# Patient Record
Sex: MALE | HISPANIC OR LATINO | Employment: OTHER | URBAN - METROPOLITAN AREA
[De-identification: names, ages, dates, MRNs, and addresses within clinical notes are randomized per-mention and may not be internally consistent; named-entity substitution may affect disease eponyms.]

---

## 2024-06-14 ENCOUNTER — HOSPITAL ENCOUNTER (EMERGENCY)
Facility: MEDICAL CENTER | Age: 44
End: 2024-06-14
Attending: EMERGENCY MEDICINE

## 2024-06-14 ENCOUNTER — APPOINTMENT (OUTPATIENT)
Dept: RADIOLOGY | Facility: MEDICAL CENTER | Age: 44
End: 2024-06-14
Attending: EMERGENCY MEDICINE

## 2024-06-14 VITALS
HEART RATE: 86 BPM | SYSTOLIC BLOOD PRESSURE: 97 MMHG | DIASTOLIC BLOOD PRESSURE: 55 MMHG | TEMPERATURE: 97.7 F | WEIGHT: 185.19 LBS | OXYGEN SATURATION: 97 % | BODY MASS INDEX: 38.7 KG/M2 | RESPIRATION RATE: 16 BRPM

## 2024-06-14 DIAGNOSIS — R10.84 GENERALIZED ABDOMINAL PAIN: ICD-10-CM

## 2024-06-14 DIAGNOSIS — K59.00 CONSTIPATION, UNSPECIFIED CONSTIPATION TYPE: ICD-10-CM

## 2024-06-14 LAB
ALBUMIN SERPL BCP-MCNC: 4.3 G/DL (ref 3.2–4.9)
ALBUMIN/GLOB SERPL: 1.4 G/DL
ALP SERPL-CCNC: 96 U/L (ref 30–99)
ALT SERPL-CCNC: 40 U/L (ref 2–50)
ANION GAP SERPL CALC-SCNC: 15 MMOL/L (ref 7–16)
AST SERPL-CCNC: 26 U/L (ref 12–45)
BASOPHILS # BLD AUTO: 1.9 % (ref 0–1.8)
BASOPHILS # BLD: 0.1 K/UL (ref 0–0.12)
BILIRUB SERPL-MCNC: 0.5 MG/DL (ref 0.1–1.5)
BUN SERPL-MCNC: 10 MG/DL (ref 8–22)
CALCIUM ALBUM COR SERPL-MCNC: 9.3 MG/DL (ref 8.5–10.5)
CALCIUM SERPL-MCNC: 9.5 MG/DL (ref 8.5–10.5)
CHLORIDE SERPL-SCNC: 104 MMOL/L (ref 96–112)
CO2 SERPL-SCNC: 21 MMOL/L (ref 20–33)
CREAT SERPL-MCNC: 0.76 MG/DL (ref 0.5–1.4)
EOSINOPHIL # BLD AUTO: 0.11 K/UL (ref 0–0.51)
EOSINOPHIL NFR BLD: 2.1 % (ref 0–6.9)
ERYTHROCYTE [DISTWIDTH] IN BLOOD BY AUTOMATED COUNT: 49.5 FL (ref 35.9–50)
GFR SERPLBLD CREATININE-BSD FMLA CKD-EPI: 112 ML/MIN/1.73 M 2
GLOBULIN SER CALC-MCNC: 3 G/DL (ref 1.9–3.5)
GLUCOSE SERPL-MCNC: 155 MG/DL (ref 65–99)
HCT VFR BLD AUTO: 44.5 % (ref 42–52)
HGB BLD-MCNC: 15.6 G/DL (ref 14–18)
IMM GRANULOCYTES # BLD AUTO: 0.01 K/UL (ref 0–0.11)
IMM GRANULOCYTES NFR BLD AUTO: 0.2 % (ref 0–0.9)
LIPASE SERPL-CCNC: 38 U/L (ref 11–82)
LYMPHOCYTES # BLD AUTO: 1.95 K/UL (ref 1–4.8)
LYMPHOCYTES NFR BLD: 37.3 % (ref 22–41)
MCH RBC QN AUTO: 30.4 PG (ref 27–33)
MCHC RBC AUTO-ENTMCNC: 35.1 G/DL (ref 32.3–36.5)
MCV RBC AUTO: 86.7 FL (ref 81.4–97.8)
MONOCYTES # BLD AUTO: 0.38 K/UL (ref 0–0.85)
MONOCYTES NFR BLD AUTO: 7.3 % (ref 0–13.4)
NEUTROPHILS # BLD AUTO: 2.68 K/UL (ref 1.82–7.42)
NEUTROPHILS NFR BLD: 51.2 % (ref 44–72)
NRBC # BLD AUTO: 0 K/UL
NRBC BLD-RTO: 0 /100 WBC (ref 0–0.2)
PLATELET # BLD AUTO: 243 K/UL (ref 164–446)
PMV BLD AUTO: 10.3 FL (ref 9–12.9)
POTASSIUM SERPL-SCNC: 3.8 MMOL/L (ref 3.6–5.5)
PROT SERPL-MCNC: 7.3 G/DL (ref 6–8.2)
RBC # BLD AUTO: 5.13 M/UL (ref 4.7–6.1)
SODIUM SERPL-SCNC: 140 MMOL/L (ref 135–145)
WBC # BLD AUTO: 5.2 K/UL (ref 4.8–10.8)

## 2024-06-14 PROCEDURE — 36415 COLL VENOUS BLD VENIPUNCTURE: CPT

## 2024-06-14 PROCEDURE — 83690 ASSAY OF LIPASE: CPT

## 2024-06-14 PROCEDURE — 80053 COMPREHEN METABOLIC PANEL: CPT

## 2024-06-14 PROCEDURE — 99284 EMERGENCY DEPT VISIT MOD MDM: CPT

## 2024-06-14 PROCEDURE — 85025 COMPLETE CBC W/AUTO DIFF WBC: CPT

## 2024-06-14 PROCEDURE — 74018 RADEX ABDOMEN 1 VIEW: CPT

## 2024-06-14 RX ORDER — DOCUSATE SODIUM 100 MG/1
100 CAPSULE, LIQUID FILLED ORAL 2 TIMES DAILY
Qty: 60 CAPSULE | Refills: 0 | Status: SHIPPED | OUTPATIENT
Start: 2024-06-14 | End: 2024-08-08

## 2024-06-14 NOTE — ED NOTES
Family at bedside. Patient given urinal, encouraged to provide sample when able. Patient given warm blankets for comfort.

## 2024-06-14 NOTE — DISCHARGE INSTRUCTIONS
Tus análisis de kika aquí fueron muy tranquilizadores. No hay signos de infección u otro problema. Bangura radiografía de abdomen no mostró signos de obstrucción. Hay bastante estreñimiento, lo que puede provocar dolor abdominal. Recomendamos comenzar con un ablandador de heces diario, que puede aliviar bangura dolor. Si bangura dolor no mejora, hable con bangura proveedor de atención primaria sobre vladimir derivación a cirugía general para considerar repetir la reparación de la hernia. A veces las hernias pueden resultar incómodas, sophia no son peligrosas a menos que queden atrapadas fuera de la pared abdominal.    Your blood tests here were very reassuring.  There is no sign of infection or other problem.  Your abdominal x-ray did not show signs of obstruction.  There is quite a bit of constipation, which can cause abdominal pain.  We recommend starting a daily stool softener, which may relieve your pain.  If your pain is not improving, talk to your primary care provider about a referral to general surgery to consider repeat hernia repair.  Hernias can sometimes be uncomfortable, but are not dangerous unless they are trapped outside the abdominal wall.

## 2024-06-14 NOTE — ED PROVIDER NOTES
ER Provider Note    Scribed for Hugo Trujillo M.d. by Rodriguez Gerardo. 6/14/2024  4:14 PM    Primary Care Provider: BRANDY Gonsalez    CHIEF COMPLAINT  Chief Complaint   Patient presents with    Abdominal Pain     Per family pt has abd hernia and now is complaining of pain.  Denies constipation     EXTERNAL RECORDS REVIEWED  External ED Note Records show patient had an incarcerated hernia in 2009.    HPI/ROS  LIMITATION TO HISTORY   Select: Language Niuean.     OUTSIDE HISTORIAN(S):  Family were present at bedside and contributed to patient history.    Gerardo Garner is a 43 y.o. male who presents to the ED complaining of abdomen pain. Patient reportedly has  hernia located in his abdomen, family reports patient has been experiencing abdomen pain for the past few days.  The hernia is well-known to them, is incisional, at the site of a previous hernia repair.  No additional pain or symptoms noted at this time.  No fevers, no vomiting.  Vital signs on arrival.  Patient has a known history of Down syndrome, communicates appropriately, cooperative, denies abdominal pain at the moment.    PAST MEDICAL HISTORY  Past Medical History:   Diagnosis Date    Down syndrome     GERD (gastroesophageal reflux disease)     Obesity     HO (obstructive sleep apnea)     VENTRAL HERNIA        SURGICAL HISTORY  Past Surgical History:   Procedure Laterality Date    VENTRAL HERNIA REPAIR LAPAROSCOPIC  10/6/2009    [facility]    VENTRAL HERNIA REPAIR  2007       FAMILY HISTORY  Family History   Problem Relation Age of Onset    Diabetes Mother     Hyperlipidemia Mother        SOCIAL HISTORY   reports that he has never smoked. He does not have any smokeless tobacco history on file. He reports that he does not drink alcohol and does not use drugs.    CURRENT MEDICATIONS  Discharge Medication List as of 6/14/2024  5:48 PM        CONTINUE these medications which have NOT CHANGED     Details   hydrocodone-acetaminophen (NORCO) 5-325 MG TABS per tablet Take 1 Tab by mouth every 8 hours as needed., Disp-8 Tab, R-0, Print Rx Paper      ondansetron (ZOFRAN) 4 MG TABS tablet 4 mg, EVERY 4 HOURS PRN Starting 3/30/2015, Until Discontinued, Disp-20 Tab, R-1, Oral      silver sulfADIAZINE (SILVADENE) 1 % CREA Apply 0.5 g to affected area(s) every day. Apply to affected area(s) every day. X 2 weeks until axillary abrasions are healed., Disp-30 g, R-0, Print Plain Paper      ranitidine (ZANTAC) 300 MG tablet Take 1 Tab by mouth every day., Disp-60 Each, R-3, Print Plain Paper      metformin (GLUCOPHAGE) 500 MG TABS Take 1 Tab by mouth 2 times a day, with meals., Disp-60 Tab, R-3, Print Plain Paper      aspirin EC (ECOTRIN) 81 MG TBEC Take 1 Tab by mouth every day., Disp-30 Tab, R-3, Print Plain Paper      Cholecalciferol 2000 UNIT CAPS 1 cap po qday., Disp-30 Cap, R-3, Print Plain Paper      fluticasone (FLONASE) 50 MCG/ACT nasal spray Spray 2 Sprays in nose every day. Each Nostril, Disp-1 Bottle, R-3, Print Plain Paper      lisinopril (PRINIVIL) 5 MG TABS Take 1 Tab by mouth every day., Disp-30 Tab, R-3, Print Plain Paper      atorvastatin (LIPITOR) 40 MG TABS Take 1 Tab by mouth every day. RPH: please discuss statin  change with patient per [facility] formulary requirements. Thank you., Disp-30 Tab, R-3, Print Plain Paper      vitamin D, Ergocalciferol, (DRISDOL) 70716 UNITS CAPS capsule Take 1 Cap by mouth every 7 days., Disp-4 Cap, R-3, Print Rx Paper      omeprazole (PRILOSEC) 20 MG CPDR Take 3 Caps by mouth every day., Disp-30 Cap, R-3, No Print      !! Misc. Devices MISC by Does not apply route. Requesting evaluation/service for patient's CPAP., Disp-1 Each, R-0, Print Rx Paper      !! Misc. Devices MISC by Does not apply route. Heated humidifier for CPAP., Disp-1 Each, R-99, Print Rx Paper      VENTOLIN  (90 BASE) MCG/ACT AERS Historical Med       !! - Potential duplicate medications  found. Please discuss with provider.          ALLERGIES  Patient has no known allergies.    PHYSICAL EXAM  VITAL SIGNS: /70   Pulse 90   Temp 36.5 °C (97.7 °F) (Temporal)   Resp 16   Wt 84 kg (185 lb 3 oz)   SpO2 98%   BMI 38.70 kg/m²   Pulse ox interpretation: I interpret this pulse ox as normal.  Constitutional: Alert in no apparent distress.  HENT: No signs of trauma, Bilateral external ears normal, Nose normal.   Eyes: Conjunctiva normal, Non-icteric.   Neck: Normal range of motion, Supple, No stridor.   Lymphatic: No lymphadenopathy noted.   Cardiovascular: Regular rate and rhythm, no murmurs.   Thorax & Lungs: Normal breath sounds, No respiratory distress, No wheezing  Abdomen: Bowel sounds alexander easily reducible ventral hernia at site of previous hernia repair  Skin: Warm, Dry, No erythema, No rash.   Back: No midline bony tenderness.   Extremities: Intact distal pulses, No edema, No cyanosis.  Musculoskeletal: Good range of motion in all major joints. No or major deformities noted.   Neurologic: Alert , Normal motor function, Normal sensory function, No focal deficits noted.   Psychiatric: Affect normal, Judgment normal, Mood normal.     DIAGNOSTIC STUDIES    Labs:   Labs Reviewed   CBC WITH DIFFERENTIAL - Abnormal; Notable for the following components:       Result Value    Basophils 1.90 (*)     All other components within normal limits   COMP METABOLIC PANEL - Abnormal; Notable for the following components:    Glucose 155 (*)     All other components within normal limits   LIPASE   ESTIMATED GFR       Radiology:   The attending emergency physician has independently interpreted the diagnostic imaging associated with this visit and am waiting the final reading from the radiologist.     Preliminary interpretation is a follows: No obstruction.  Increase stool burden.    Radiologist interpretation:   [id number]-[id number] VIEW   Final Result      1.  Moderate constipation.          COURSE & MEDICAL DECISION  MAKING     INITIAL ASSESSMENT, COURSE AND PLAN  Care Narrative:         4:14 PM Patient presents to the ED with abdomen pain. Patient evaluated at bedside and discussed plan of care, including Imagining and lab work for further evaluation.  Labs were ordered at triage per protocol, GFR, CBC w.diff, CMP, Lipase to evaluate his symptoms.  These have resulted and are unremarkable, excluding infectious causes, pancreatitis, and other possibilities.  I have added a KUB to evaluate for any suggestion of obstruction.  Differential diagnoses include but not limited to: hernia, unlikely associated acute obstruction or incarceration, constipation may contribute to pain.     5:52 PM- Patient blood tests were very reassuring.  There is no sign of infection or other problem.  Abdominal x-ray did not show signs of obstruction. There is quite a bit of constipation, which can cause abdominal pain.  Recommend starting a daily stool softener, which may relieve pain.  If pain is not improving, recommended patient to talk to primary care provider about a referral to general surgery to consider repeat hernia repair. The patient will be discharged with Colace 100 mg and should return if symptoms worsen or if new symptoms arise. The patient understands and agrees to plan.  They will follow-up with primary care to consider whether or not referral back to general surgery for repeat hernia repair in the future as an option, though with central obesity, and incisional hernia, he is likely a poor surgical candidate.    ADDITIONAL PROBLEM MANAGED  History of of chronic hernia at site of previous hernia repair, in addition to acute presentation and concerns.    DISPOSITION AND DISCUSSIONS  I have discussed management of the patient with the following physicians and CHALINO's:  None      Escalation of care considered, and ultimately not performed: acute inpatient care management, however at this time, the patient is most appropriate for outpatient  management.    Barriers to care at this time, including but not limited to:  None .     Decision tools and prescription drugs considered including, but not limited to:  Colace 100 mg .    The patient will return for new or worsening symptoms and is stable at the time of discharge.    DISPOSITION:  Patient will be discharged home in stable condition.    FOLLOW UP:  BRANDY Gonsalez  [address]  [place] [address]  [phone]    Schedule an appointment as soon as possible for a visit       OUTPATIENT MEDICATIONS:  Discharge Medication List as of 6/14/2024  5:48 PM        START taking these medications    Details   docusate sodium (COLACE) 100 MG Cap Take 1 Capsule by mouth 2 times a day., Disp-60 Capsule, R-0, Normal              FINAL DIAGNOSIS  1. Constipation, unspecified constipation type    2. Generalized abdominal pain       Rodriguez FOREMAN (Thierry), am scribing for, and in the presence of, Hugo Trujillo M.D..    Electronically signed by: Rodriguez Yang), 6/14/2024    Hugo FOREMAN M.D. personally performed the services described in this documentation, as scribed by Rodriguez Gerardo in my presence, and it is both accurate and complete.

## 2024-06-14 NOTE — ED TRIAGE NOTES
Chief Complaint   Patient presents with    Abdominal Pain     Per family pt has abd hernia and now is complaining of pain.  Denies constipation     Pt c/o pain for weeks

## 2024-06-15 NOTE — ED NOTES
Patient discharged per order. Oral and written discharge instructions reviewed with patient, mother and sister.  All belongings accounted for and taken with patient. Questions answered, and patient agrees with discharge plan. Encouraged to follow up with PCP. Ambulatory to lobby with family

## 2024-08-02 ENCOUNTER — PRE-ADMISSION TESTING (OUTPATIENT)
Dept: ADMISSIONS | Facility: MEDICAL CENTER | Age: 44
End: 2024-08-02
Attending: SURGERY
Payer: MEDICAID

## 2024-08-08 ENCOUNTER — PRE-ADMISSION TESTING (OUTPATIENT)
Dept: ADMISSIONS | Facility: MEDICAL CENTER | Age: 44
End: 2024-08-08
Attending: SURGERY
Payer: MEDICAID

## 2024-08-08 ENCOUNTER — PRE-ADMISSION TESTING (OUTPATIENT)
Dept: ADMISSIONS | Facility: MEDICAL CENTER | Age: 44
End: 2024-08-08
Attending: SURGERY

## 2024-08-08 DIAGNOSIS — Z01.812 PRE-OPERATIVE LABORATORY EXAMINATION: ICD-10-CM

## 2024-08-08 DIAGNOSIS — Z01.810 PRE-OPERATIVE CARDIOVASCULAR EXAMINATION: ICD-10-CM

## 2024-08-08 LAB
EKG IMPRESSION: NORMAL
EST. AVERAGE GLUCOSE BLD GHB EST-MCNC: 183 MG/DL
HBA1C MFR BLD: 8 % (ref 4–5.6)

## 2024-08-08 PROCEDURE — 36415 COLL VENOUS BLD VENIPUNCTURE: CPT

## 2024-08-08 PROCEDURE — 83036 HEMOGLOBIN GLYCOSYLATED A1C: CPT

## 2024-08-08 PROCEDURE — 93005 ELECTROCARDIOGRAM TRACING: CPT

## 2024-08-08 PROCEDURE — 93010 ELECTROCARDIOGRAM REPORT: CPT | Performed by: INTERNAL MEDICINE

## 2024-08-08 RX ORDER — GLIMEPIRIDE 4 MG/1
2 TABLET ORAL DAILY
COMMUNITY
Start: 2024-06-07

## 2024-08-08 RX ORDER — MULTIVIT-MIN/IRON/FOLIC ACID/K 18-600-40
CAPSULE ORAL DAILY
COMMUNITY

## 2024-08-08 RX ORDER — GINSENG 100 MG
CAPSULE ORAL DAILY
COMMUNITY

## 2024-08-08 RX ORDER — SITAGLIPTIN 50 MG/1
50 TABLET, FILM COATED ORAL DAILY
COMMUNITY
Start: 2024-06-07

## 2024-08-08 RX ORDER — ACETAMINOPHEN 325 MG/1
650 TABLET ORAL EVERY 4 HOURS PRN
COMMUNITY

## 2024-08-08 RX ORDER — ATORVASTATIN CALCIUM 20 MG/1
1 TABLET, FILM COATED ORAL DAILY
COMMUNITY
Start: 2024-06-07

## 2024-08-08 RX ORDER — MULTIVIT WITH MINERALS/LUTEIN
TABLET ORAL DAILY
COMMUNITY

## 2024-08-08 NOTE — PREPROCEDURE INSTRUCTIONS
PreAdmit Telephone Appointment: Reviewed the Preparing for your procedure handout with patient's sister, Jia (with permission noted in epic) over the phone. instructed per pharmacy guidelines regarding taking, holding or contacting provider for instructions on regularly prescribed medications before surgery. Instructed to take the following medications the day of surgery with a sip of water per pharmacy medication guidelines: sister reports patient sleeps in so he does not take his medication until around noon, sister reports checking in at 0630 day of surgery.  Advised if surgery moved to later in the day pt can take Atorvastatin and Omeprazole, Tylenol if needed.   Denies issues with anesthesia  Jia, sister offered  and she said she is ok without  and advised her if at anytime during the surgery process she needs an  we can obtain one      Confirmed with patient where to check in morning of surgery. Handouts/Brochure/Video emailed to patient.

## 2024-08-11 ENCOUNTER — ANESTHESIA EVENT (OUTPATIENT)
Dept: SURGERY | Facility: MEDICAL CENTER | Age: 44
End: 2024-08-11

## 2024-08-12 ENCOUNTER — ANESTHESIA (OUTPATIENT)
Dept: SURGERY | Facility: MEDICAL CENTER | Age: 44
End: 2024-08-12

## 2024-08-12 ENCOUNTER — HOSPITAL ENCOUNTER (OUTPATIENT)
Facility: MEDICAL CENTER | Age: 44
End: 2024-08-12
Attending: SURGERY | Admitting: SURGERY
Payer: MEDICAID

## 2024-08-12 VITALS
BODY MASS INDEX: 37.05 KG/M2 | HEART RATE: 99 BPM | RESPIRATION RATE: 16 BRPM | TEMPERATURE: 97.2 F | DIASTOLIC BLOOD PRESSURE: 74 MMHG | SYSTOLIC BLOOD PRESSURE: 121 MMHG | WEIGHT: 188.71 LBS | OXYGEN SATURATION: 92 % | HEIGHT: 60 IN

## 2024-08-12 DIAGNOSIS — G89.18 POST-OPERATIVE PAIN: ICD-10-CM

## 2024-08-12 LAB — GLUCOSE BLD STRIP.AUTO-MCNC: 162 MG/DL (ref 65–99)

## 2024-08-12 PROCEDURE — 160009 HCHG ANES TIME/MIN: Performed by: SURGERY

## 2024-08-12 PROCEDURE — 160047 HCHG PACU  - EA ADDL 30 MINS PHASE II: Performed by: SURGERY

## 2024-08-12 PROCEDURE — 82962 GLUCOSE BLOOD TEST: CPT

## 2024-08-12 PROCEDURE — 700101 HCHG RX REV CODE 250: Performed by: SURGERY

## 2024-08-12 PROCEDURE — 700101 HCHG RX REV CODE 250: Performed by: ANESTHESIOLOGY

## 2024-08-12 PROCEDURE — 160046 HCHG PACU - 1ST 60 MINS PHASE II: Performed by: SURGERY

## 2024-08-12 PROCEDURE — 160002 HCHG RECOVERY MINUTES (STAT): Performed by: SURGERY

## 2024-08-12 PROCEDURE — 160035 HCHG PACU - 1ST 60 MINS PHASE I: Performed by: SURGERY

## 2024-08-12 PROCEDURE — 502714 HCHG ROBOTIC SURGERY SERVICES: Performed by: SURGERY

## 2024-08-12 PROCEDURE — 700102 HCHG RX REV CODE 250 W/ 637 OVERRIDE(OP): Performed by: ANESTHESIOLOGY

## 2024-08-12 PROCEDURE — 160025 RECOVERY II MINUTES (STATS): Performed by: SURGERY

## 2024-08-12 PROCEDURE — 700105 HCHG RX REV CODE 258: Performed by: SURGERY

## 2024-08-12 PROCEDURE — 160042 HCHG SURGERY MINUTES - EA ADDL 1 MIN LEVEL 5: Performed by: SURGERY

## 2024-08-12 PROCEDURE — 160048 HCHG OR STATISTICAL LEVEL 1-5: Performed by: SURGERY

## 2024-08-12 PROCEDURE — 160036 HCHG PACU - EA ADDL 30 MINS PHASE I: Performed by: SURGERY

## 2024-08-12 PROCEDURE — 700111 HCHG RX REV CODE 636 W/ 250 OVERRIDE (IP): Mod: JZ | Performed by: ANESTHESIOLOGY

## 2024-08-12 PROCEDURE — 160031 HCHG SURGERY MINUTES - 1ST 30 MINS LEVEL 5: Performed by: SURGERY

## 2024-08-12 PROCEDURE — A9270 NON-COVERED ITEM OR SERVICE: HCPCS | Performed by: ANESTHESIOLOGY

## 2024-08-12 PROCEDURE — C1781 MESH (IMPLANTABLE): HCPCS | Performed by: SURGERY

## 2024-08-12 DEVICE — MESH VENTRALIGHT 4 X 6 INCH - (1EA/CA): Type: IMPLANTABLE DEVICE | Site: ABDOMEN | Status: FUNCTIONAL

## 2024-08-12 RX ORDER — HYDROMORPHONE HYDROCHLORIDE 1 MG/ML
0.2 INJECTION, SOLUTION INTRAMUSCULAR; INTRAVENOUS; SUBCUTANEOUS
Status: DISCONTINUED | OUTPATIENT
Start: 2024-08-12 | End: 2024-08-12 | Stop reason: HOSPADM

## 2024-08-12 RX ORDER — HYDRALAZINE HYDROCHLORIDE 20 MG/ML
5 INJECTION INTRAMUSCULAR; INTRAVENOUS
Status: DISCONTINUED | OUTPATIENT
Start: 2024-08-12 | End: 2024-08-12 | Stop reason: HOSPADM

## 2024-08-12 RX ORDER — BUPIVACAINE HYDROCHLORIDE AND EPINEPHRINE 5; 5 MG/ML; UG/ML
INJECTION, SOLUTION EPIDURAL; INTRACAUDAL; PERINEURAL
Status: DISCONTINUED | OUTPATIENT
Start: 2024-08-12 | End: 2024-08-12 | Stop reason: HOSPADM

## 2024-08-12 RX ORDER — ALBUTEROL SULFATE 5 MG/ML
2.5 SOLUTION RESPIRATORY (INHALATION)
Status: DISCONTINUED | OUTPATIENT
Start: 2024-08-12 | End: 2024-08-12 | Stop reason: HOSPADM

## 2024-08-12 RX ORDER — ONDANSETRON 2 MG/ML
INJECTION INTRAMUSCULAR; INTRAVENOUS PRN
Status: DISCONTINUED | OUTPATIENT
Start: 2024-08-12 | End: 2024-08-12 | Stop reason: SURG

## 2024-08-12 RX ORDER — POLYETHYLENE GLYCOL 3350 17 G/17G
17 POWDER, FOR SOLUTION ORAL DAILY
Qty: 20 EACH | Refills: 0 | Status: SHIPPED | OUTPATIENT
Start: 2024-08-12 | End: 2024-08-12

## 2024-08-12 RX ORDER — CEFAZOLIN SODIUM 1 G/3ML
INJECTION, POWDER, FOR SOLUTION INTRAMUSCULAR; INTRAVENOUS PRN
Status: DISCONTINUED | OUTPATIENT
Start: 2024-08-12 | End: 2024-08-12 | Stop reason: SURG

## 2024-08-12 RX ORDER — OXYCODONE HCL 5 MG/5 ML
5 SOLUTION, ORAL ORAL
Status: COMPLETED | OUTPATIENT
Start: 2024-08-12 | End: 2024-08-12

## 2024-08-12 RX ORDER — ACETAMINOPHEN 500 MG
1000 TABLET ORAL ONCE
Status: COMPLETED | OUTPATIENT
Start: 2024-08-12 | End: 2024-08-12

## 2024-08-12 RX ORDER — OXYCODONE HYDROCHLORIDE 5 MG/1
5 TABLET ORAL EVERY 4 HOURS PRN
Qty: 12 TABLET | Refills: 0 | Status: SHIPPED | OUTPATIENT
Start: 2024-08-12 | End: 2024-08-12

## 2024-08-12 RX ORDER — HYDROMORPHONE HYDROCHLORIDE 1 MG/ML
0.1 INJECTION, SOLUTION INTRAMUSCULAR; INTRAVENOUS; SUBCUTANEOUS
Status: DISCONTINUED | OUTPATIENT
Start: 2024-08-12 | End: 2024-08-12 | Stop reason: HOSPADM

## 2024-08-12 RX ORDER — OXYCODONE HYDROCHLORIDE 5 MG/1
5 TABLET ORAL EVERY 4 HOURS PRN
Qty: 12 TABLET | Refills: 0 | Status: SHIPPED | OUTPATIENT
Start: 2024-08-12 | End: 2024-08-15

## 2024-08-12 RX ORDER — ROCURONIUM BROMIDE 10 MG/ML
INJECTION, SOLUTION INTRAVENOUS PRN
Status: DISCONTINUED | OUTPATIENT
Start: 2024-08-12 | End: 2024-08-12 | Stop reason: SURG

## 2024-08-12 RX ORDER — ACETAMINOPHEN 325 MG/1
650 TABLET ORAL EVERY 6 HOURS
Qty: 60 TABLET | Refills: 0 | Status: SHIPPED | OUTPATIENT
Start: 2024-08-12

## 2024-08-12 RX ORDER — ONDANSETRON 2 MG/ML
4 INJECTION INTRAMUSCULAR; INTRAVENOUS
Status: COMPLETED | OUTPATIENT
Start: 2024-08-12 | End: 2024-08-12

## 2024-08-12 RX ORDER — IBUPROFEN 600 MG/1
600 TABLET, FILM COATED ORAL EVERY 6 HOURS
Qty: 45 TABLET | Refills: 0 | Status: SHIPPED | OUTPATIENT
Start: 2024-08-12 | End: 2024-08-12

## 2024-08-12 RX ORDER — IBUPROFEN 600 MG/1
600 TABLET, FILM COATED ORAL EVERY 6 HOURS
Qty: 45 TABLET | Refills: 0 | Status: SHIPPED | OUTPATIENT
Start: 2024-08-12

## 2024-08-12 RX ORDER — ACETAMINOPHEN 325 MG/1
650 TABLET ORAL EVERY 6 HOURS
Qty: 60 TABLET | Refills: 0 | Status: SHIPPED | OUTPATIENT
Start: 2024-08-12 | End: 2024-08-12

## 2024-08-12 RX ORDER — SODIUM CHLORIDE, SODIUM LACTATE, POTASSIUM CHLORIDE, CALCIUM CHLORIDE 600; 310; 30; 20 MG/100ML; MG/100ML; MG/100ML; MG/100ML
INJECTION, SOLUTION INTRAVENOUS CONTINUOUS
Status: ACTIVE | OUTPATIENT
Start: 2024-08-12 | End: 2024-08-12

## 2024-08-12 RX ORDER — OXYCODONE HCL 5 MG/5 ML
7.5 SOLUTION, ORAL ORAL
Status: COMPLETED | OUTPATIENT
Start: 2024-08-12 | End: 2024-08-12

## 2024-08-12 RX ORDER — POLYETHYLENE GLYCOL 3350 17 G/17G
17 POWDER, FOR SOLUTION ORAL DAILY
Qty: 20 EACH | Refills: 0 | Status: SHIPPED | OUTPATIENT
Start: 2024-08-12

## 2024-08-12 RX ORDER — LIDOCAINE HYDROCHLORIDE 20 MG/ML
INJECTION, SOLUTION EPIDURAL; INFILTRATION; INTRACAUDAL; PERINEURAL PRN
Status: DISCONTINUED | OUTPATIENT
Start: 2024-08-12 | End: 2024-08-12 | Stop reason: SURG

## 2024-08-12 RX ORDER — PHENYLEPHRINE HCL IN 0.9% NACL 1 MG/10 ML
SYRINGE (ML) INTRAVENOUS PRN
Status: DISCONTINUED | OUTPATIENT
Start: 2024-08-12 | End: 2024-08-12 | Stop reason: SURG

## 2024-08-12 RX ADMIN — FENTANYL CITRATE 25 MCG: 50 INJECTION, SOLUTION INTRAMUSCULAR; INTRAVENOUS at 09:27

## 2024-08-12 RX ADMIN — PROPOFOL 150 MG: 10 INJECTION, EMULSION INTRAVENOUS at 08:29

## 2024-08-12 RX ADMIN — ROCURONIUM BROMIDE 40 MG: 50 INJECTION, SOLUTION INTRAVENOUS at 08:29

## 2024-08-12 RX ADMIN — ONDANSETRON 4 MG: 2 INJECTION INTRAMUSCULAR; INTRAVENOUS at 11:56

## 2024-08-12 RX ADMIN — Medication 100 MCG: at 08:45

## 2024-08-12 RX ADMIN — ONDANSETRON 4 MG: 2 INJECTION INTRAMUSCULAR; INTRAVENOUS at 08:34

## 2024-08-12 RX ADMIN — CEFAZOLIN 2 G: 1 INJECTION, POWDER, FOR SOLUTION INTRAMUSCULAR; INTRAVENOUS at 08:28

## 2024-08-12 RX ADMIN — FENTANYL CITRATE 25 MCG: 50 INJECTION, SOLUTION INTRAMUSCULAR; INTRAVENOUS at 10:42

## 2024-08-12 RX ADMIN — SUGAMMADEX 200 MG: 100 INJECTION, SOLUTION INTRAVENOUS at 09:23

## 2024-08-12 RX ADMIN — ACETAMINOPHEN 1000 MG: 500 TABLET ORAL at 07:41

## 2024-08-12 RX ADMIN — SODIUM CHLORIDE, POTASSIUM CHLORIDE, SODIUM LACTATE AND CALCIUM CHLORIDE: 600; 310; 30; 20 INJECTION, SOLUTION INTRAVENOUS at 08:23

## 2024-08-12 RX ADMIN — OXYCODONE HYDROCHLORIDE 5 MG: 5 SOLUTION ORAL at 10:42

## 2024-08-12 RX ADMIN — LIDOCAINE HYDROCHLORIDE 60 MG: 20 INJECTION, SOLUTION EPIDURAL; INFILTRATION; INTRACAUDAL at 08:29

## 2024-08-12 RX ADMIN — FENTANYL CITRATE 25 MCG: 50 INJECTION, SOLUTION INTRAMUSCULAR; INTRAVENOUS at 10:13

## 2024-08-12 RX ADMIN — FENTANYL CITRATE 50 MCG: 50 INJECTION, SOLUTION INTRAMUSCULAR; INTRAVENOUS at 08:29

## 2024-08-12 RX ADMIN — FENTANYL CITRATE 25 MCG: 50 INJECTION, SOLUTION INTRAMUSCULAR; INTRAVENOUS at 11:27

## 2024-08-12 RX ADMIN — FENTANYL CITRATE 25 MCG: 50 INJECTION, SOLUTION INTRAMUSCULAR; INTRAVENOUS at 09:53

## 2024-08-12 ASSESSMENT — PAIN DESCRIPTION - PAIN TYPE: TYPE: SURGICAL PAIN

## 2024-08-12 ASSESSMENT — FIBROSIS 4 INDEX: FIB4 SCORE: 0.78

## 2024-08-12 ASSESSMENT — PAIN SCALES - GENERAL: PAIN_LEVEL: 6

## 2024-08-12 NOTE — DISCHARGE INSTRUCTIONS
If any questions arise, call your provider.  If your provider is not available, please feel free to call the Surgical Center at [phone].    MEDICATIONS: Resume taking daily medication.  Take prescribed pain medication with food.  If no medication is prescribed, you may take non-aspirin pain medication if needed.  PAIN MEDICATION CAN BE VERY CONSTIPATING.  Take a stool softener or laxative such as senokot, pericolace, or milk of magnesia if needed.    Last pain medication given at _____________    What to Expect Post Anesthesia    Rest and take it easy for the first 24 hours.  A responsible adult is recommended to remain with you during that time.  It is normal to feel sleepy.  We encourage you to not do anything that requires balance, judgment or coordination.    FOR 24 HOURS DO NOT:  Drive, operate machinery or run household appliances.  Drink beer or alcoholic beverages.  Make important decisions or sign legal documents.    To avoid nausea, slowly advance diet as tolerated, avoiding spicy or greasy foods for the first day.  Add more substantial food to your diet according to your provider's instructions.  Babies can be fed formula or breast milk as soon as they are hungry.  INCREASE FLUIDS AND FIBER TO AVOID CONSTIPATION.    MILD FLU-LIKE SYMPTOMS ARE NORMAL.  YOU MAY EXPERIENCE GENERALIZED MUSCLE ACHES, THROAT IRRITATION, HEADACHE AND/OR SOME NAUSEA.      Hernia Repair Discharge Instructions:    ACTIVITIES: Upon discharge from the hospital, the day of surgery it is requested that you do no significant physical activity and limit mental activities, as you have had sedation. The day after surgery, you may resume activities of daily living, but for four weeks, it is recommended that you do no strenuous activities or heavy lifting (greater than 15 pounds).     DRIVING: You may drive whenever you are off pain medications and are able to perform the activities needed to drive, i.e. turning, bending, twisting,  etc.     WOUND: It is not unusual for patients to experience swelling and even bruising at the hernia repair site.      ICE: please use ice on the wound to decrease the swelling for the first 24 hours and then discontinue.     BATHING: The dressing can be removed two days after surgery and the wound can then be wetted in a shower as normal, but avoid submersion in water (tub bath) for at least 2 weeks.    PAIN MEDICATION: You will be given a prescription for pain medication at discharge. Please take these as directed. It is important to remember not to take medications on an empty stomach as this may cause nausea.     BOWEL FUNCTION: After hernia repair, it is not uncommon for patients to experience constipation. This is due to decreasing activity levels as well as pain medications. You may wish to use a stool softener beginning immediately after surgery, and you may or may not need to use a laxative (Milk of Magnesia, Ex-lax; Senokot, etc.) as well.            CALL IF YOU HAVE: (1) Fevers to more than 1010 F, (2) Unusual chest or leg pain,  (3) Drainage or fluid from incision that may be foul smelling, increased tenderness or  soreness at the wound or the wound edges are no longer together,redness or swelling at the incision site. Please do not hesitate to call with any other questions.     APPOINTMENT: Contact our office at [phone] for a follow-up appointment in 2 weeks following your procedure.     If you have any additional questions, please do not hesitate to call the office.     Office address:  [address] [place] [address], [address] [place] [address]

## 2024-08-12 NOTE — OR NURSING
1246: Patient arrived to phase II from PACU 1 via gurney. Report received from RN. Respirations are spontaneous and unlabored. VSS on RA. 3 lap sites are ALEX and CDI. Family at bedside    1302: Patient education completed, family denies further questions.     1345: Pt voiding with help of CNA.    1350: IV removed with tip intact. DC'd to care of family post uneventful stay in PACU 2.

## 2024-08-12 NOTE — ANESTHESIA POSTPROCEDURE EVALUATION
Patient: Gerardo Garner    Procedure Summary       Date: 08/12/24 Room / Location: [place] [address] / SURGERY [facility]    Anesthesia Start: 0823 Anesthesia Stop: 0933    Procedure: ROBOTIC INCISIONAL HERNIA REPAIR (Abdomen) Diagnosis: (RECURRENT VENTRAL INCISIONAL)    Surgeons: Syed Parker M.D. Responsible Provider: Ty Coleman M.D.    Anesthesia Type: general ASA Status: 3            Final Anesthesia Type: general  Last vitals  BP   Blood Pressure: 124/76    Temp   37.1 °C (98.8 °F)    Pulse   91   Resp   16    SpO2   90 %      Anesthesia Post Evaluation    Patient location during evaluation: PACU  Patient participation: complete - patient cannot participate  Level of consciousness: awake and alert  Pain score: 6    Airway patency: patent  Anesthetic complications: no  Cardiovascular status: hemodynamically stable  Respiratory status: acceptable  Hydration status: euvolemic    PONV: none          No notable events documented.     Nurse Pain Score: 6 (NPRS)

## 2024-08-12 NOTE — OR NURSING
"1030 - Report received from Suzy MELTON.  Pt resting comfortably, arousable, rating pain as \"a little\", VSS.    1045 - Pt not tolerating oxy mask, switched to nasal canula.  Pt grimacing/moaning, reporting pain as \"more\", medication given, no nausea.  Tolerates sips of water, VSS.    1100 - Pt resting comfortably.  Reporting pain in abdomen as getting \"better\", no nausea, VSS.     Report to Suzy MELTON  "

## 2024-08-12 NOTE — ANESTHESIA TIME REPORT
Anesthesia Start and Stop Event Times       Date Time Event    8/12/2024 0714 Ready for Procedure     0823 Anesthesia Start     0933 Anesthesia Stop          Responsible Staff  08/12/24      Name Role Begin End    Ty Coleman M.D. Anesth 0823 0933          Overtime Reason:  no overtime (within assigned shift)    Comments:

## 2024-08-12 NOTE — OR NURSING
"0930 To PACU from OR by a criseldarbeth; sleeping, respirations spontaneous and nonlabored via OPA.    0945 pt grimacing and moaning, PRN analgesic given as ordered. Lab stabs x3 on left quadrant, ALEX, clean, dry, and approximated.     0955 pt desatting to 83%-88% on 2L NC. Pt placed on 4L oxymask.     1005 pt's sister at bedside, pt sleeping, no s/s of pain or discomfort noted.     1015 pt medicated for pain, VSS. Pt given sips of water, tolerating well     1100 assumed pt care, pt sleeping at this time, VSS.     1155 pt bacame nauseated when sat up from [place] to recliner, Zofran given as ordered.    1220 pt sitting up in the recliner, pain is \"better\", sister at bedside to translate. Surgical site remains CDI, ice pack in placed. Pt placed on room air.    1230 pt given juice and water, tolerating well.     1243 pt maintaining 02 sat >91% on room air, pain is at tolerable level, nausea resolved. Pt meets criteria to transfer to stage 2.           "

## 2024-08-12 NOTE — OP REPORT
Operative Report    Date: 8/12/2024    Surgeon: Syed Parker M.D.     Assistant: None    Pre-operative Diagnosis: Recurrent Incisional Hernia measuring 6 cm in total length    Post-operative Diagnosis: Same    Procedure: Robotically assisted intraperitoneal onlay mesh repair    ASA Classification: III.    Indications: This is a 44 y.o. male who presented with symptoms of recurrent incisional hernia here for repair.    The indications for a surgical assistant in this surgery were indicated due to complexity of the procedure. Their role included aiding in incision, retraction, holding devices including camera for laparoscopic procedure, and closure of the wound.      Findings: Primary closure obtained with plication of diastasis as possible    Wound Classification: Class I, I, Clean..    Procedure in detail: The patient was seen and examined in the preoperative holding area.  The risks benefits and alternatives of the procedure were discussed with the patient who wished to proceed with the procedure as described.  The patient was transferred to the operating room placed in supine position and all pressure points were properly padded.  General endotracheal anesthesia was induced and preoperative antibiotics were given per SCIP protocol.  Patient's abdomen was prepped with ChloraPrep and draped in the normal sterile fashion.  A timeout was performed confirming correct patient, correct procedure, and that all necessary equipment was in the room.      We began the procedure by performing a left upper quadrant Optiview access.  We sharply incised the skin after infusing local anesthetic and used the 5 mm Optiview port to access the abdomen.  Pneumoperitoneum was then achieved and maintained to 15mm mercury carbon dioxide throughout the entirety of the case.  Adhesions were lysed as appropriate to ensure that we could place the remainder of our ports under direct visualization and under direct visualization a left  "lower quadrant and a left mid abdominal low port were placed.  We then looked back at the Optiview access port and changed it to a robotic port.     Continue to lyse adhesions until the entire abdominal cavity is free from the hernia and the hernia sac as well as the midline of the incisions.  The previous mesh was dropped from the abdominal wall to allow for abutment of the new mesh to the abdominal wall itself.  We then proceeded to plicate the diastatic linea alba is appropriate to close the hernia defect with an 0 strata fix suture.  We then selected an appropriate size mesh to overlap the hernia At least 4 cm on all sides.  We then secured the mesh to the abdominal wall using several running 2-0 strata fix sutures.    4\" x 6\" ventralite st mesh was used.    All ports were removed and all incisions are closed with 4-0 Monocryl in a circular fashion.  Dermabond was then placed over the wounds.    The patient was awakened from general anesthetic, and was taken to the recovery room in stable condition.    Sponge and needle counts were correct at the end of the case.     Specimen: none    EBL: 10mL    Dispo: stable, extubated, to PACU    Syed Parker M.D.  [facility]  [phone]       "

## 2024-08-12 NOTE — ANESTHESIA PREPROCEDURE EVALUATION
Case: [id number] Date/Time: 08/12/24 0810    Procedure: ROBOTIC INCISIONAL HERNIA REPAIR    Pre-op diagnosis: RECURRENT VENTRAL INCISIONAL    Location: [facility] OR 01 / SURGERY [facility]    Surgeons: Syed Parker M.D.            Relevant Problems   ANESTHESIA   (positive) HO (obstructive sleep apnea)      ENDO   (positive) Type 2 diabetes mellitus, without long-term current use of insulin (HCC)      Other   (positive) Down syndrome   (positive) Obesity   BMI of 40.8    Physical Exam    Airway   Mallampati: IV  TM distance: >3 FB  Neck ROM: full    Comments: Large tongue   Cardiovascular - normal exam  Rhythm: regular  Rate: normal  (-) murmur     Dental - normal exam           Pulmonary - normal exam  Breath sounds clear to auscultation     Abdominal   (+) obese     Neurological - normal exam                   Anesthesia Plan    ASA 3   ASA physical status 3 criteria: morbid obesity - BMI greater than or equal to 40    Plan - general       Airway plan will be ETT        Plan Factors:   Patient was not previously instructed to abstain from smoking on day of procedure.  Patient did not smoke on day of procedure.      Induction: intravenous    Postoperative Plan: Postoperative administration of opioids is intended.    Pertinent diagnostic labs and testing reviewed    Informed Consent:    Anesthetic plan and risks discussed with patient.    Use of blood products discussed with: patient whom consented to blood products.

## 2024-08-12 NOTE — ANESTHESIA PROCEDURE NOTES
Airway    Date/Time: 8/12/2024 8:30 AM    Performed by: Ty Coleman M.D.  Authorized by: Ty Coleman M.D.    Location:  OR  Urgency:  Elective  Indications for Airway Management:  Anesthesia      Spontaneous Ventilation: absent    Sedation Level:  Deep  Preoxygenated: Yes    Patient Position:  Sniffing  MILS Maintained Throughout: No    Mask Difficulty Assessment:  2 - vent by mask + OA or adjuvant +/- NMBA  Final Airway Type:  Endotracheal airway  Final Endotracheal Airway:  ETT  Cuffed: Yes    Technique Used for Successful ETT Placement:  Video laryngoscopy    Insertion Site:  Oral  Blade Type:  Glide  Laryngoscope Blade/Videolaryngoscope Blade Size:  4  ETT Size (mm):  7.0  Measured from:  Lips  ETT to Lips (cm):  23  Placement Verified by: auscultation and capnometry    Cormack-Lehane Classification:  Grade I - full view of glottis  Number of Attempts at Approach:  1  Number of Other Approaches Attempted:  0

## (undated) DEVICE — SYSTEM CLEARIFY VISUALIZATION (10EA/PK)

## (undated) DEVICE — OBTURATOR BLADELESS STANDARD 8MM (6EA/BX)

## (undated) DEVICE — GLOVE SZ 7.5 LF PROTEXIS (50PR/BX)

## (undated) DEVICE — SUTURE 2-0 20CM STRATAFIX SPIRAL SH NEEDLE (12/BX)

## (undated) DEVICE — TUBE CONNECTING SUCTION - CLEAR PLASTIC STERILE 72 IN (50EA/CA)

## (undated) DEVICE — Device

## (undated) DEVICE — SUTURE GENERAL

## (undated) DEVICE — SYRINGE 30 ML LS (56/BX)

## (undated) DEVICE — SLEEVE VASO CALF MED - (10PR/CA)

## (undated) DEVICE — SUTURE 4-0 MONOCRYL PLUS PS-2 - 27 INCH (36/BX)

## (undated) DEVICE — SODIUM CHL IRRIGATION 0.9% 1000ML (12EA/CA)

## (undated) DEVICE — DRAPE ARM BOX OF 20

## (undated) DEVICE — DERMABOND ADVANCED - (12EA/BX)

## (undated) DEVICE — TUBE E-T HI-LO CUFF 7.0MM (10EA/PK)

## (undated) DEVICE — DRAPE ABDOMINAL STERILE LAPAROSCOPIC 102IN X 121IN 77IN (12EA/CA)

## (undated) DEVICE — COVER TIP ENDOWRIST HOT SHEAR - (10EA/BX) DA VINCI

## (undated) DEVICE — TROCAR 5X100 NON BLADED Z-TH - READ KII (6/BX)

## (undated) DEVICE — TOWEL STOP TIMEOUT SAFETY FLAG (40EA/CA)

## (undated) DEVICE — DEVICE CLOSURE ABSORBABLE BLUE SYNETURE V-LOC 1 GS-22 L12 IN (12EA/CT)

## (undated) DEVICE — DRAPE COLUMN BOX OF 20

## (undated) DEVICE — GLOVE BIOGEL PI INDICATOR SZ 8.0 SURGICAL PF LF -(50/BX 4BX/CA)

## (undated) DEVICE — SET LEADWIRE 5 LEAD BEDSIDE DISPOSABLE ECG (1SET OF 5/EA)

## (undated) DEVICE — BLADE SURGICAL #15 - (50/BX 3BX/CA)